# Patient Record
Sex: FEMALE | Race: OTHER | URBAN - METROPOLITAN AREA
[De-identification: names, ages, dates, MRNs, and addresses within clinical notes are randomized per-mention and may not be internally consistent; named-entity substitution may affect disease eponyms.]

---

## 2021-02-27 ENCOUNTER — EMERGENCY (EMERGENCY)
Facility: HOSPITAL | Age: 61
LOS: 1 days | Discharge: HOME | End: 2021-02-27
Attending: EMERGENCY MEDICINE
Payer: COMMERCIAL

## 2021-02-27 VITALS
HEART RATE: 79 BPM | SYSTOLIC BLOOD PRESSURE: 190 MMHG | OXYGEN SATURATION: 97 % | WEIGHT: 160.06 LBS | TEMPERATURE: 98 F | RESPIRATION RATE: 18 BRPM | DIASTOLIC BLOOD PRESSURE: 80 MMHG

## 2021-02-27 VITALS — RESPIRATION RATE: 17 BRPM | OXYGEN SATURATION: 100 %

## 2021-02-27 LAB
ALBUMIN SERPL ELPH-MCNC: 3.8 G/DL — SIGNIFICANT CHANGE UP (ref 3.5–5)
ALP SERPL-CCNC: 91 U/L — SIGNIFICANT CHANGE UP (ref 40–120)
ALT FLD-CCNC: 21 U/L DA — SIGNIFICANT CHANGE UP (ref 10–60)
ANION GAP SERPL CALC-SCNC: 4 MMOL/L — LOW (ref 5–17)
APPEARANCE UR: CLEAR — SIGNIFICANT CHANGE UP
AST SERPL-CCNC: 17 U/L — SIGNIFICANT CHANGE UP (ref 10–40)
BASOPHILS # BLD AUTO: 0.02 K/UL — SIGNIFICANT CHANGE UP (ref 0–0.2)
BASOPHILS NFR BLD AUTO: 0.3 % — SIGNIFICANT CHANGE UP (ref 0–2)
BILIRUB SERPL-MCNC: 0.7 MG/DL — SIGNIFICANT CHANGE UP (ref 0.2–1.2)
BILIRUB UR-MCNC: NEGATIVE — SIGNIFICANT CHANGE UP
BUN SERPL-MCNC: 10 MG/DL — SIGNIFICANT CHANGE UP (ref 7–18)
CALCIUM SERPL-MCNC: 9.1 MG/DL — SIGNIFICANT CHANGE UP (ref 8.4–10.5)
CHLORIDE SERPL-SCNC: 105 MMOL/L — SIGNIFICANT CHANGE UP (ref 96–108)
CO2 SERPL-SCNC: 30 MMOL/L — SIGNIFICANT CHANGE UP (ref 22–31)
COLOR SPEC: YELLOW — SIGNIFICANT CHANGE UP
CREAT SERPL-MCNC: 0.84 MG/DL — SIGNIFICANT CHANGE UP (ref 0.5–1.3)
DIFF PNL FLD: ABNORMAL
EOSINOPHIL # BLD AUTO: 0.03 K/UL — SIGNIFICANT CHANGE UP (ref 0–0.5)
EOSINOPHIL NFR BLD AUTO: 0.4 % — SIGNIFICANT CHANGE UP (ref 0–6)
EPI CELLS # UR: ABNORMAL /HPF
GLUCOSE SERPL-MCNC: 100 MG/DL — HIGH (ref 70–99)
GLUCOSE UR QL: NEGATIVE — SIGNIFICANT CHANGE UP
HCT VFR BLD CALC: 38.5 % — SIGNIFICANT CHANGE UP (ref 34.5–45)
HGB BLD-MCNC: 12.3 G/DL — SIGNIFICANT CHANGE UP (ref 11.5–15.5)
IMM GRANULOCYTES NFR BLD AUTO: 0.3 % — SIGNIFICANT CHANGE UP (ref 0–1.5)
KETONES UR-MCNC: NEGATIVE — SIGNIFICANT CHANGE UP
LEUKOCYTE ESTERASE UR-ACNC: NEGATIVE — SIGNIFICANT CHANGE UP
LYMPHOCYTES # BLD AUTO: 2.13 K/UL — SIGNIFICANT CHANGE UP (ref 1–3.3)
LYMPHOCYTES # BLD AUTO: 28.6 % — SIGNIFICANT CHANGE UP (ref 13–44)
MCHC RBC-ENTMCNC: 27.8 PG — SIGNIFICANT CHANGE UP (ref 27–34)
MCHC RBC-ENTMCNC: 31.9 GM/DL — LOW (ref 32–36)
MCV RBC AUTO: 86.9 FL — SIGNIFICANT CHANGE UP (ref 80–100)
MONOCYTES # BLD AUTO: 0.49 K/UL — SIGNIFICANT CHANGE UP (ref 0–0.9)
MONOCYTES NFR BLD AUTO: 6.6 % — SIGNIFICANT CHANGE UP (ref 2–14)
NEUTROPHILS # BLD AUTO: 4.77 K/UL — SIGNIFICANT CHANGE UP (ref 1.8–7.4)
NEUTROPHILS NFR BLD AUTO: 63.8 % — SIGNIFICANT CHANGE UP (ref 43–77)
NITRITE UR-MCNC: NEGATIVE — SIGNIFICANT CHANGE UP
NRBC # BLD: 0 /100 WBCS — SIGNIFICANT CHANGE UP (ref 0–0)
PH UR: 6 — SIGNIFICANT CHANGE UP (ref 5–8)
PLATELET # BLD AUTO: 271 K/UL — SIGNIFICANT CHANGE UP (ref 150–400)
POTASSIUM SERPL-MCNC: 3.6 MMOL/L — SIGNIFICANT CHANGE UP (ref 3.5–5.3)
POTASSIUM SERPL-SCNC: 3.6 MMOL/L — SIGNIFICANT CHANGE UP (ref 3.5–5.3)
PROT SERPL-MCNC: 7.8 G/DL — SIGNIFICANT CHANGE UP (ref 6–8.3)
PROT UR-MCNC: NEGATIVE — SIGNIFICANT CHANGE UP
RBC # BLD: 4.43 M/UL — SIGNIFICANT CHANGE UP (ref 3.8–5.2)
RBC # FLD: 12.7 % — SIGNIFICANT CHANGE UP (ref 10.3–14.5)
RBC CASTS # UR COMP ASSIST: ABNORMAL /HPF (ref 0–2)
SODIUM SERPL-SCNC: 139 MMOL/L — SIGNIFICANT CHANGE UP (ref 135–145)
SP GR SPEC: 1.01 — SIGNIFICANT CHANGE UP (ref 1.01–1.02)
TROPONIN I SERPL-MCNC: 0.03 NG/ML — SIGNIFICANT CHANGE UP (ref 0–0.04)
UROBILINOGEN FLD QL: NEGATIVE — SIGNIFICANT CHANGE UP
WBC # BLD: 7.46 K/UL — SIGNIFICANT CHANGE UP (ref 3.8–10.5)
WBC # FLD AUTO: 7.46 K/UL — SIGNIFICANT CHANGE UP (ref 3.8–10.5)
WBC UR QL: SIGNIFICANT CHANGE UP /HPF (ref 0–5)

## 2021-02-27 PROCEDURE — 70450 CT HEAD/BRAIN W/O DYE: CPT | Mod: 26

## 2021-02-27 PROCEDURE — 93005 ELECTROCARDIOGRAM TRACING: CPT

## 2021-02-27 PROCEDURE — 99284 EMERGENCY DEPT VISIT MOD MDM: CPT | Mod: 25

## 2021-02-27 PROCEDURE — 93010 ELECTROCARDIOGRAM REPORT: CPT

## 2021-02-27 PROCEDURE — 80053 COMPREHEN METABOLIC PANEL: CPT

## 2021-02-27 PROCEDURE — 85025 COMPLETE CBC W/AUTO DIFF WBC: CPT

## 2021-02-27 PROCEDURE — 36415 COLL VENOUS BLD VENIPUNCTURE: CPT

## 2021-02-27 PROCEDURE — 99285 EMERGENCY DEPT VISIT HI MDM: CPT

## 2021-02-27 PROCEDURE — 81001 URINALYSIS AUTO W/SCOPE: CPT

## 2021-02-27 PROCEDURE — 70450 CT HEAD/BRAIN W/O DYE: CPT

## 2021-02-27 PROCEDURE — 96374 THER/PROPH/DIAG INJ IV PUSH: CPT

## 2021-02-27 PROCEDURE — 82962 GLUCOSE BLOOD TEST: CPT

## 2021-02-27 PROCEDURE — 84484 ASSAY OF TROPONIN QUANT: CPT

## 2021-02-27 RX ORDER — MECLIZINE HCL 12.5 MG
1 TABLET ORAL
Qty: 15 | Refills: 0
Start: 2021-02-27 | End: 2021-03-03

## 2021-02-27 RX ORDER — ACETAMINOPHEN 500 MG
650 TABLET ORAL ONCE
Refills: 0 | Status: COMPLETED | OUTPATIENT
Start: 2021-02-27 | End: 2021-02-27

## 2021-02-27 RX ORDER — DIAZEPAM 5 MG
2 TABLET ORAL ONCE
Refills: 0 | Status: DISCONTINUED | OUTPATIENT
Start: 2021-02-27 | End: 2021-02-27

## 2021-02-27 RX ORDER — MECLIZINE HCL 12.5 MG
25 TABLET ORAL ONCE
Refills: 0 | Status: COMPLETED | OUTPATIENT
Start: 2021-02-27 | End: 2021-02-27

## 2021-02-27 RX ORDER — HYDRALAZINE HCL 50 MG
10 TABLET ORAL ONCE
Refills: 0 | Status: COMPLETED | OUTPATIENT
Start: 2021-02-27 | End: 2021-02-27

## 2021-02-27 RX ORDER — SODIUM CHLORIDE 9 MG/ML
1000 INJECTION INTRAMUSCULAR; INTRAVENOUS; SUBCUTANEOUS ONCE
Refills: 0 | Status: COMPLETED | OUTPATIENT
Start: 2021-02-27 | End: 2021-02-27

## 2021-02-27 RX ORDER — METOCLOPRAMIDE HCL 10 MG
10 TABLET ORAL ONCE
Refills: 0 | Status: COMPLETED | OUTPATIENT
Start: 2021-02-27 | End: 2021-02-27

## 2021-02-27 RX ADMIN — Medication 104 MILLIGRAM(S): at 16:03

## 2021-02-27 RX ADMIN — Medication 650 MILLIGRAM(S): at 13:23

## 2021-02-27 RX ADMIN — Medication 25 MILLIGRAM(S): at 15:39

## 2021-02-27 RX ADMIN — Medication 2 MILLIGRAM(S): at 18:00

## 2021-02-27 RX ADMIN — SODIUM CHLORIDE 1000 MILLILITER(S): 9 INJECTION INTRAMUSCULAR; INTRAVENOUS; SUBCUTANEOUS at 15:39

## 2021-02-27 NOTE — ED PROVIDER NOTE - NSFOLLOWUPCLINICS_GEN_ALL_ED_FT
[facility]  [facility]  [address] [facility]. - [address] [place] [address]  Phone: [phone]  Fax: [phone]  Follow Up Time:     [facility]  Neurology  [address] [place] [address]  Phone: [phone]  Fax: [phone]  Follow Up Time:

## 2021-02-27 NOTE — ED ADULT NURSE NOTE - CHPI ED NUR SYMPTOMS NEG
no blurred vision/no change in level of consciousness/no loss of consciousness/no numbness/no weakness

## 2021-02-27 NOTE — ED PROVIDER NOTE - PROGRESS NOTE DETAILS
patient feeling improved. ambulatory with steady gait. incidental findings discussed. shared decision making had with patient regarding admission and declined. Will f/u with pmd next week for bp check and further evaluation.

## 2021-02-27 NOTE — ED PROVIDER NOTE - CRANIAL NERVE AND PUPILLARY EXAM
horizontal nystagmus/cranial nerves 2-12 intact/cough reflex intact/corneal reflex intact/central and peripheral vision intact/central vision intact/peripheral vision intact/extra-ocular movements intact/gag reflex intact/tongue is midline fatigable horizontal nystagmus/cranial nerves 2-12 intact/corneal reflex intact/central and peripheral vision intact/extra-ocular movements intact/tongue is midline

## 2021-02-27 NOTE — ED PROVIDER NOTE - CARE PLAN
Principal Discharge DX:	Vertigo   Principal Discharge DX:	Vertigo  Secondary Diagnosis:	Hypertension

## 2021-02-27 NOTE — ED PROVIDER NOTE - OBJECTIVE STATEMENT
59 y/o female with PMHx of gastric bypass presents to the ED c/o dizziness x yesterday. Pt notes she used to have HTN, HLD and DM prior to gastric bypass surgery. Pt notes spinning sensation since yesterday when she changes positions. Pt was seen at  today and was referred to ED for eval. Pt found to have elevated BP at 196/98. Pt in ED notes mild diffuse HA and L sided ear pain, Sx have improved but still with dizziness. Pt denies photophobia, LOC, or any other complaints. No head trauma. Denies fevers, nausea, emesis, chest pain, shortness of breath, abdominal pain, dysuria, hematuria, diarrhea, constipation, or any other acute complaints. NKDA.

## 2021-02-27 NOTE — ED PROVIDER NOTE - PATIENT PORTAL LINK FT
You can access the FollowMyHealth Patient Portal offered by [facility] by registering at the following website: [email or web address]. By joining [facility]’s FollowMyHealth portal, you will also be able to view your health information using other applications (apps) compatible with our system.

## 2021-02-27 NOTE — ED PROVIDER NOTE - CLINICAL SUMMARY MEDICAL DECISION MAKING FREE TEXT BOX
61 yo F with vertiginous symptoms and elevated blood pressure. neurologically intact with fatigable nystagmus. Patient CT without acute abnormalities. Given Antivert and fluids. patient offered admission and declined. Improved and will follow up with PMD. Nontoxic and medically stable for discharge. Return precautions provided and patient understands to return to the ED for worsening signs and symptoms. Instructed to follow up with primary care physician/specialist and agreeable. Patient's questions answered and given copies of lab results. 59 yo F with vertiginous symptoms and elevated blood pressure. neurologically intact with fatigable nystagmus. Patient CT without acute abnormalities. Given Antivert and fluids. Orthostatics positive. patient offered admission and declined. Improved and will follow up with PMD next week. Nontoxic and medically stable for discharge. Return precautions provided and patient understands to return to the ED for worsening signs and symptoms. Instructed to follow up with primary care physician/specialist and agreeable. Patient's questions answered and given copies of lab results.

## 2021-02-27 NOTE — ED ADULT NURSE NOTE - OBJECTIVE STATEMENT
pt from home c/o of dizziness and Lt ear pain since yesterday, pt reports woke up with dizziness and nausea, and its worsening when she looks down, denies any head injury ambulatory with assistance

## 2021-02-27 NOTE — ED PROVIDER NOTE - CHPI ED SYMPTOMS POS
Next appt 6-4-20  Last appt 12-20-19    Refill request for/Last refilled info;  DULoxetine (CYMBALTA) 30 MG capsule 30 capsule 3 12/20/2019     Sig - Route: Take 1 capsule by mouth daily. - Oral      Refill to be completed per standing protocol   Thank you, Refill Center Staff       L ear pain/DIZZINESS/HEADACHE

## 2021-02-27 NOTE — ED PROVIDER NOTE - ENMT, MLM
Airway patent, Nasal mucosa clear. Mouth with normal mucosa. Throat has no vesicles, no oropharyngeal exudates and uvula is midline. Airway patent, Nasal mucosa clear. Mouth with normal mucosa. Throat has no vesicles, no oropharyngeal exudates and uvula is midline. TM clear without effusion, TM intact with good cone of light b/l

## 2021-02-27 NOTE — ED PROVIDER NOTE - NSFOLLOWUPINSTRUCTIONS_ED_ALL_ED_FT
Please follow up with your primary care doctor for repeat blood pressure check and ER follow up. Please return to the Emergency Department for worsening signs or symptoms.      Vertigo      Vertigo is the feeling that you or your surroundings are moving when they are not. This feeling can come and go at any time. Vertigo often goes away on its own. Vertigo can be dangerous if it occurs while you are doing something that could endanger you or others, such as driving or operating machinery.    Your health care provider will do tests to determine the cause of your vertigo. Tests will also help your health care provider decide how best to treat your condition.      Follow these instructions at home:      Eating and drinking                   •Drink enough fluid to keep your urine pale yellow.      • Do not drink alcohol.      Activity     •Return to your normal activities as told by your health care provider. Ask your health care provider what activities are safe for you.      •In the morning, first sit up on the side of the bed. When you feel okay, stand slowly while you hold onto something until you know that your balance is fine.      •Move slowly. Avoid sudden body or head movements or certain positions, as told by your health care provider.      •If you have trouble walking or keeping your balance, try using a cane for stability. If you feel dizzy or unstable, sit down right away.      •Avoid doing any tasks that would cause danger to you or others if vertigo occurs.      •Avoid bending down if you feel dizzy. Place items in your home so that they are easy for you to reach without leaning over.      • Do not drive or use heavy machinery if you feel dizzy.      General instructions     •Take over-the-counter and prescription medicines only as told by your health care provider.      •Keep all follow-up visits as told by your health care provider. This is important.        Contact a health care provider if:    •Your medicines do not relieve your vertigo or they make it worse.      •You have a fever.      •Your condition gets worse or you develop new symptoms.      •Your family or friends notice any behavioral changes.      •Your nausea or vomiting gets worse.      •You have numbness or a prickling and tingling sensation in part of your body.        Get help right away if you:    •Have difficulty moving or speaking.      •Are always dizzy.      •Faint.      •Develop severe headaches.      •Have weakness in your hands, arms, or legs.      •Have changes in your hearing or vision.      •Develop a stiff neck.      •Develop sensitivity to light.        Summary    •Vertigo is the feeling that you or your surroundings are moving when they are not.      •Your health care provider will do tests to determine the cause of your vertigo.      •Follow instructions for home care. You may be told to avoid certain tasks, positions, or movements.      •Contact a health care provider if your medicines do not relieve your symptoms, or if you have a fever, nausea, vomiting, or changes in behavior.      •Get help right away if you have severe headaches or difficulty speaking, or you develop hearing or vision problems.      This information is not intended to replace advice given to you by your health care provider. Make sure you discuss any questions you have with your health care provider. Please follow up with your primary care doctor for repeat blood pressure check and ER follow up. Please return to the Emergency Department for worsening signs or symptoms.      Vertigo      Vertigo is the feeling that you or your surroundings are moving when they are not. This feeling can come and go at any time. Vertigo often goes away on its own. Vertigo can be dangerous if it occurs while you are doing something that could endanger you or others, such as driving or operating machinery.    Your health care provider will do tests to determine the cause of your vertigo. Tests will also help your health care provider decide how best to treat your condition.      Follow these instructions at home:      Eating and drinking                   •Drink enough fluid to keep your urine pale yellow.      • Do not drink alcohol.      Activity     •Return to your normal activities as told by your health care provider. Ask your health care provider what activities are safe for you.      •In the morning, first sit up on the side of the bed. When you feel okay, stand slowly while you hold onto something until you know that your balance is fine.      •Move slowly. Avoid sudden body or head movements or certain positions, as told by your health care provider.      •If you have trouble walking or keeping your balance, try using a cane for stability. If you feel dizzy or unstable, sit down right away.      •Avoid doing any tasks that would cause danger to you or others if vertigo occurs.      •Avoid bending down if you feel dizzy. Place items in your home so that they are easy for you to reach without leaning over.      • Do not drive or use heavy machinery if you feel dizzy.      General instructions     •Take over-the-counter and prescription medicines only as told by your health care provider.      •Keep all follow-up visits as told by your health care provider. This is important.        Contact a health care provider if:    •Your medicines do not relieve your vertigo or they make it worse.      •You have a fever.      •Your condition gets worse or you develop new symptoms.      •Your family or friends notice any behavioral changes.      •Your nausea or vomiting gets worse.      •You have numbness or a prickling and tingling sensation in part of your body.        Get help right away if you:    •Have difficulty moving or speaking.      •Are always dizzy.      •Faint.      •Develop severe headaches.      •Have weakness in your hands, arms, or legs.      •Have changes in your hearing or vision.      •Develop a stiff neck.      •Develop sensitivity to light.        Summary    •Vertigo is the feeling that you or your surroundings are moving when they are not.      •Your health care provider will do tests to determine the cause of your vertigo.      •Follow instructions for home care. You may be told to avoid certain tasks, positions, or movements.      •Contact a health care provider if your medicines do not relieve your symptoms, or if you have a fever, nausea, vomiting, or changes in behavior.      •Get help right away if you have severe headaches or difficulty speaking, or you develop hearing or vision problems.      This information is not intended to replace advice given to you by your health care provider. Make sure you discuss any questions you have with your health care provider.      Hypertension, Adult      High blood pressure (hypertension) is when the force of blood pumping through the arteries is too strong. The arteries are the blood vessels that carry blood from the heart throughout the body. Hypertension forces the heart to work harder to pump blood and may cause arteries to become narrow or stiff. Untreated or uncontrolled hypertension can cause a heart attack, heart failure, a stroke, kidney disease, and other problems.    A blood pressure reading consists of a higher number over a lower number. Ideally, your blood pressure should be below 120/80. The first ("top") number is called the systolic pressure. It is a measure of the pressure in your arteries as your heart beats. The second ("bottom") number is called the diastolic pressure. It is a measure of the pressure in your arteries as the heart relaxes.      What are the causes?    The exact cause of this condition is not known. There are some conditions that result in or are related to high blood pressure.      What increases the risk?  Some risk factors for high blood pressure are under your control. The following factors may make you more likely to develop this condition:  •Smoking.      •Having type 2 diabetes mellitus, high cholesterol, or both.      •Not getting enough exercise or physical activity.      •Being overweight.      •Having too much fat, sugar, calories, or salt (sodium) in your diet.      •Drinking too much alcohol.    Some risk factors for high blood pressure may be difficult or impossible to change. Some of these factors include:  •Having chronic kidney disease.      •Having a family history of high blood pressure.      •Age. Risk increases with age.      •Race. You may be at higher risk if you are .      •Gender. Men are at higher risk than women before age 45. After age 65, women are at higher risk than men.      •Having obstructive sleep apnea.      •Stress.        What are the signs or symptoms?  High blood pressure may not cause symptoms. Very high blood pressure (hypertensive crisis) may cause:  •Headache.      •Anxiety.      •Shortness of breath.      •Nosebleed.      •Nausea and vomiting.      •Vision changes.      •Severe chest pain.      •Seizures.        How is this diagnosed?    This condition is diagnosed by measuring your blood pressure while you are seated, with your arm resting on a flat surface, your legs uncrossed, and your feet flat on the floor. The cuff of the blood pressure monitor will be placed directly against the skin of your upper arm at the level of your heart. It should be measured at least twice using the same arm. Certain conditions can cause a difference in blood pressure between your right and left arms.  Certain factors can cause blood pressure readings to be lower or higher than normal for a short period of time:  •When your blood pressure is higher when you are in a health care provider's office than when you are at home, this is called white coat hypertension. Most people with this condition do not need medicines.      •When your blood pressure is higher at home than when you are in a health care provider's office, this is called masked hypertension. Most people with this condition may need medicines to control blood pressure.    If you have a high blood pressure reading during one visit or you have normal blood pressure with other risk factors, you may be asked to:  •Return on a different day to have your blood pressure checked again.      •Monitor your blood pressure at home for 1 week or longer.      If you are diagnosed with hypertension, you may have other blood or imaging tests to help your health care provider understand your overall risk for other conditions.      How is this treated?  This condition is treated by making healthy lifestyle changes, such as eating healthy foods, exercising more, and reducing your alcohol intake. Your health care provider may prescribe medicine if lifestyle changes are not enough to get your blood pressure under control, and if:  •Your systolic blood pressure is above 130.      •Your diastolic blood pressure is above 80.      Your personal target blood pressure may vary depending on your medical conditions, your age, and other factors.      Follow these instructions at home:      Eating and drinking    •Eat a diet that is high in fiber and potassium, and low in sodium, added sugar, and fat. An example eating plan is called the DASH (Dietary Approaches to Stop Hypertension) diet. To eat this way:  •Eat plenty of fresh fruits and vegetables. Try to fill one half of your plate at each meal with fruits and vegetables.      •Eat whole grains, such as whole-wheat pasta, brown rice, or whole-grain bread. Fill about one fourth of your plate with whole grains.      •Eat or drink low-fat dairy products, such as skim milk or low-fat yogurt.      •Avoid fatty cuts of meat, processed or cured meats, and poultry with skin. Fill about one fourth of your plate with lean proteins, such as fish, chicken without skin, beans, eggs, or tofu.      •Avoid pre-made and processed foods. These tend to be higher in sodium, added sugar, and fat.        •Reduce your daily sodium intake. Most people with hypertension should eat less than 1,500 mg of sodium a day.    • Do not drink alcohol if:  •Your health care provider tells you not to drink.      •You are pregnant, may be pregnant, or are planning to become pregnant.      •If you drink alcohol:•Limit how much you use to:  •0–1 drink a day for women.      •0–2 drinks a day for men.        •Be aware of how much alcohol is in your drink. In the [place] one drink equals one 12 oz bottle of beer (355 mL), one 5 oz glass of wine (148 mL), or one 1½ oz glass of hard liquor (44 mL).          Lifestyle      •Work with your health care provider to maintain a healthy body weight or to lose weight. Ask what an ideal weight is for you.      •Get at least 30 minutes of exercise most days of the week. Activities may include walking, swimming, or biking.      •Include exercise to strengthen your muscles (resistance exercise), such as Pilates or lifting weights, as part of your weekly exercise routine. Try to do these types of exercises for 30 minutes at least 3 days a week.      • Do not use any products that contain nicotine or tobacco, such as cigarettes, e-cigarettes, and chewing tobacco. If you need help quitting, ask your health care provider.      •Monitor your blood pressure at home as told by your health care provider.      •Keep all follow-up visits as told by your health care provider. This is important.      Medicines     •Take over-the-counter and prescription medicines only as told by your health care provider. Follow directions carefully. Blood pressure medicines must be taken as prescribed.      • Do not skip doses of blood pressure medicine. Doing this puts you at risk for problems and can make the medicine less effective.      •Ask your health care provider about side effects or reactions to medicines that you should watch for.        Contact a health care provider if you:    •Think you are having a reaction to a medicine you are taking.      •Have headaches that keep coming back (recurring).      •Feel dizzy.      •Have swelling in your ankles.      •Have trouble with your vision.        Get help right away if you:    •Develop a severe headache or confusion.      •Have unusual weakness or numbness.      •Feel faint.      •Have severe pain in your chest or abdomen.      •Vomit repeatedly.      •Have trouble breathing.        Summary    •Hypertension is when the force of blood pumping through your arteries is too strong. If this condition is not controlled, it may put you at risk for serious complications.      •Your personal target blood pressure may vary depending on your medical conditions, your age, and other factors. For most people, a normal blood pressure is less than 120/80.      •Hypertension is treated with lifestyle changes, medicines, or a combination of both. Lifestyle changes include losing weight, eating a healthy, low-sodium diet, exercising more, and limiting alcohol.      This information is not intended to replace advice given to you by your health care provider. Make sure you discuss any questions you have with your health care provider.

## 2025-03-14 NOTE — ED PROVIDER NOTE - IV ALTEPLASE ADMIN OUTSIDE HIDDEN
Please review; protocol failed/No Protocol      Last Office Visit: 12/23/2024    Last Vitamin D Labs 10/19/2024      Component  Ref Range & Units 10/19/24 10:13 AM   Vitamin D, 25OH, Total  30.0 - 100.0 ng/mL 51.2     
show